# Patient Record
Sex: FEMALE | Race: WHITE | Employment: PART TIME | URBAN - METROPOLITAN AREA
[De-identification: names, ages, dates, MRNs, and addresses within clinical notes are randomized per-mention and may not be internally consistent; named-entity substitution may affect disease eponyms.]

---

## 2023-08-30 RX ORDER — NORGESTIMATE AND ETHINYL ESTRADIOL 0.25-0.035
KIT ORAL
Qty: 84 TABLET | OUTPATIENT
Start: 2023-08-30

## 2023-09-01 ENCOUNTER — TELEPHONE (OUTPATIENT)
Age: 23
End: 2023-09-01

## 2023-09-01 NOTE — TELEPHONE ENCOUNTER
Two patient identifiers used    26year old patient last seen in the office on 8/14/2023    Patient is calling about her medication that was sent on 8/14/2023 and that she is not able to  her medication and to contact the office    This nurse contacted the pharmacy and was advised that they do not have the patients insurance information and the patient needs to update her insurance information through the portal or contact the pharmacy    Patient was advised and verbalized understanding

## 2023-09-05 RX ORDER — NORGESTIMATE AND ETHINYL ESTRADIOL 0.25-0.035
KIT ORAL
Qty: 84 TABLET | OUTPATIENT
Start: 2023-09-05

## 2024-07-22 RX ORDER — NORGESTIMATE AND ETHINYL ESTRADIOL 0.25-0.035
1 KIT ORAL DAILY
Qty: 84 TABLET | OUTPATIENT
Start: 2024-07-22

## 2024-08-05 ENCOUNTER — TELEPHONE (OUTPATIENT)
Age: 24
End: 2024-08-05

## 2024-08-05 NOTE — TELEPHONE ENCOUNTER
Two patient identifiers used .  HIPAA verified to speak to mother and she was advised that patient would need to place the call since her name was not on the HIPAA form.      24 year old patient last seen in the office on 8/14/2023 and has next appointment on  8/15/2024.    Mother will have her daughter to contact the office.    Mother verbalized understanding.

## 2024-08-06 NOTE — TELEPHONE ENCOUNTER
Two patient identifiers used      Patient calling back about needing a refill from yesterday    Patient was advised about updating her HIPAA form to speak to mother or not.    Patient states she has enough prescription to get her to her scheduled appointment for ae on 8/15/2024    Patient was advised to keep appointment and at appointment MD can refill the prescription for a year.    Patient verbalized understanding.

## 2024-08-15 ENCOUNTER — OFFICE VISIT (OUTPATIENT)
Age: 24
End: 2024-08-15
Payer: COMMERCIAL

## 2024-08-15 VITALS — SYSTOLIC BLOOD PRESSURE: 107 MMHG | WEIGHT: 134.4 LBS | DIASTOLIC BLOOD PRESSURE: 69 MMHG

## 2024-08-15 DIAGNOSIS — Z01.419 ENCOUNTER FOR WELL WOMAN EXAM WITH ROUTINE GYNECOLOGICAL EXAM: Primary | ICD-10-CM

## 2024-08-15 PROCEDURE — 99395 PREV VISIT EST AGE 18-39: CPT | Performed by: OBSTETRICS & GYNECOLOGY

## 2024-08-15 RX ORDER — NORGESTIMATE AND ETHINYL ESTRADIOL 0.25-0.035
1 KIT ORAL DAILY
Qty: 3 PACKET | Refills: 4 | Status: SHIPPED | OUTPATIENT
Start: 2024-08-15

## 2024-08-15 SDOH — ECONOMIC STABILITY: FOOD INSECURITY: WITHIN THE PAST 12 MONTHS, THE FOOD YOU BOUGHT JUST DIDN'T LAST AND YOU DIDN'T HAVE MONEY TO GET MORE.: NEVER TRUE

## 2024-08-15 SDOH — ECONOMIC STABILITY: FOOD INSECURITY: WITHIN THE PAST 12 MONTHS, YOU WORRIED THAT YOUR FOOD WOULD RUN OUT BEFORE YOU GOT MONEY TO BUY MORE.: NEVER TRUE

## 2024-08-15 SDOH — ECONOMIC STABILITY: INCOME INSECURITY: HOW HARD IS IT FOR YOU TO PAY FOR THE VERY BASICS LIKE FOOD, HOUSING, MEDICAL CARE, AND HEATING?: NOT HARD AT ALL

## 2024-08-15 ASSESSMENT — PATIENT HEALTH QUESTIONNAIRE - PHQ9
1. LITTLE INTEREST OR PLEASURE IN DOING THINGS: NOT AT ALL
SUM OF ALL RESPONSES TO PHQ QUESTIONS 1-9: 0
SUM OF ALL RESPONSES TO PHQ9 QUESTIONS 1 & 2: 0
SUM OF ALL RESPONSES TO PHQ QUESTIONS 1-9: 0

## 2024-08-15 NOTE — PROGRESS NOTES
Shu Yu is a 24 y.o. female returns for an annual exam     No chief complaint on file.      No LMP recorded.  Her periods are moderate in flow and usually regular with a 26-32 day interval with 3-7 day duration.  She does not have dysmenorrhea.  Problems: no problems  Birth Control: OCP (estrogen/progesterone).request for refill  Last Pap: normal obtained 1 year(s) ago.  She does not have a history of ELLIOTT 2, 3 or cervical cancer.   With regard to the Gardisil vaccine, she has not received it yet      1. Have you been to the ER, urgent care clinic, or hospitalized since your last visit? No    2. Have you seen or consulted any other health care providers outside of the [facility] since your last visit? No    Examination chaperoned by Nyla To LPN.

## 2024-08-15 NOTE — PROGRESS NOTES
Annual exam      Shu Yu is a No obstetric history on file.,  24 y.o. female   Patient's last menstrual period was 08/03/2024.    She presents for her annual checkup.     She is having no problems.  Periods ok.  Headaches unchanged.    Menstrual status:    Her periods are moderate in flow and usually regular with a 26-32 day interval with 3-7 day duration.  She does not have dysmenorrhea.    Sexual history:    She  reports being sexually active and has had partner(s) who are male.    Per Nursing Note:  Last Pap: normal obtained 1 year(s) ago.  She does not have a history of ELLIOTT 2, 3 or cervical cancer.   With regard to the Gardisil vaccine, she has not received it yet    History reviewed. No pertinent past medical history.  History reviewed. No pertinent surgical history.    Current Outpatient Medications   Medication Sig Dispense Refill    norgestimate-ethinyl estradiol (ORTHO-CYCLEN) 0.25-35 MG-MCG per tablet Take 1 tablet by mouth daily 3 packet 4    spironolactone (ALDACTONE) 100 MG tablet Take by mouth daily (Patient not taking: Reported on 8/15/2024)       No current facility-administered medications for this visit.     Allergies: Patient has no known allergies.     Tobacco History:  reports that she has never smoked. She has never used smokeless tobacco.  Alcohol Abuse:  reports no history of alcohol use.  Drug Abuse:  reports no history of drug use.    Family Medical/Cancer History: History reviewed. No pertinent family history.     Review of Systems - History obtained from the patient  Constitutional: negative for weight loss, fever, night sweats  HEENT: negative for hearing loss, earache, congestion, snoring, sorethroat  CV: negative for chest pain, palpitations, edema  Resp: negative for cough, shortness of breath, wheezing  GI: negative for change in bowel habits, abdominal pain, black or bloody stools  : negative for frequency, dysuria, hematuria, vaginal discharge  MSK: negative for back